# Patient Record
Sex: MALE | Race: OTHER | NOT HISPANIC OR LATINO
[De-identification: names, ages, dates, MRNs, and addresses within clinical notes are randomized per-mention and may not be internally consistent; named-entity substitution may affect disease eponyms.]

---

## 2018-06-26 PROBLEM — Z00.00 ENCOUNTER FOR PREVENTIVE HEALTH EXAMINATION: Status: ACTIVE | Noted: 2018-06-26

## 2018-07-20 PROBLEM — Z78.9 NON-SMOKER: Status: ACTIVE | Noted: 2018-07-20

## 2018-07-20 PROBLEM — Z86.39 HISTORY OF HIGH CHOLESTEROL: Status: RESOLVED | Noted: 2018-07-20 | Resolved: 2018-07-20

## 2018-07-20 PROBLEM — Z86.79 HISTORY OF HIGH BLOOD PRESSURE: Status: RESOLVED | Noted: 2018-07-20 | Resolved: 2018-07-20

## 2018-07-20 PROBLEM — Z87.19 HISTORY OF HEMORRHOIDS: Status: RESOLVED | Noted: 2018-07-20 | Resolved: 2018-07-20

## 2018-07-20 PROBLEM — Z86.39 HISTORY OF VITAMIN D DEFICIENCY: Status: RESOLVED | Noted: 2018-07-20 | Resolved: 2018-07-20

## 2018-07-24 ENCOUNTER — APPOINTMENT (OUTPATIENT)
Dept: SURGERY | Facility: CLINIC | Age: 58
End: 2018-07-24
Payer: COMMERCIAL

## 2018-07-24 VITALS
TEMPERATURE: 98.4 F | BODY MASS INDEX: 25.34 KG/M2 | SYSTOLIC BLOOD PRESSURE: 125 MMHG | WEIGHT: 177 LBS | HEIGHT: 70 IN | DIASTOLIC BLOOD PRESSURE: 76 MMHG | HEART RATE: 76 BPM

## 2018-07-24 DIAGNOSIS — Z86.79 PERSONAL HISTORY OF OTHER DISEASES OF THE CIRCULATORY SYSTEM: ICD-10-CM

## 2018-07-24 DIAGNOSIS — Z87.19 PERSONAL HISTORY OF OTHER DISEASES OF THE DIGESTIVE SYSTEM: ICD-10-CM

## 2018-07-24 DIAGNOSIS — Z86.39 PERSONAL HISTORY OF OTHER ENDOCRINE, NUTRITIONAL AND METABOLIC DISEASE: ICD-10-CM

## 2018-07-24 DIAGNOSIS — Z78.9 OTHER SPECIFIED HEALTH STATUS: ICD-10-CM

## 2018-07-24 PROCEDURE — 99203 OFFICE O/P NEW LOW 30 MIN: CPT

## 2018-07-24 RX ORDER — CHROMIUM 200 MCG
TABLET ORAL
Refills: 0 | Status: ACTIVE | COMMUNITY

## 2018-07-24 RX ORDER — FENOFIBRATE 145 MG/1
TABLET ORAL
Refills: 0 | Status: ACTIVE | COMMUNITY

## 2019-08-06 ENCOUNTER — APPOINTMENT (OUTPATIENT)
Dept: SURGERY | Facility: CLINIC | Age: 59
End: 2019-08-06
Payer: COMMERCIAL

## 2019-08-06 VITALS
BODY MASS INDEX: 24.05 KG/M2 | HEIGHT: 70 IN | HEART RATE: 74 BPM | SYSTOLIC BLOOD PRESSURE: 114 MMHG | DIASTOLIC BLOOD PRESSURE: 67 MMHG | WEIGHT: 168 LBS

## 2019-08-06 DIAGNOSIS — K40.90 UNILATERAL INGUINAL HERNIA, W/OUT OBSTRUCTION OR GANGRENE, NOT SPECIFIED AS RECURRENT: ICD-10-CM

## 2019-08-06 PROCEDURE — 99214 OFFICE O/P EST MOD 30 MIN: CPT

## 2019-08-06 NOTE — CONSULT LETTER
[Dear  ___] : Dear  [unfilled], [Consult Closing:] : Thank you very much for allowing me to participate in the care of this patient.  If you have any questions, please do not hesitate to contact me. [Sincerely,] : Sincerely, [FreeTextEntry3] : Darío Guzman MD\par

## 2019-08-06 NOTE — HISTORY OF PRESENT ILLNESS
[de-identified] : This is a 58 year old patient who was referred by Dr. Theresa Brown with the chief complaint of having swelling in the right groin. He had it for few years and the swelling is getting larger with no real pain. Reducible right inguinal hernia.\par Has pain in the area

## 2019-08-07 PROBLEM — K40.90 INGUINAL HERNIA, RIGHT: Status: ACTIVE | Noted: 2018-07-20

## 2019-08-23 ENCOUNTER — OUTPATIENT (OUTPATIENT)
Dept: OUTPATIENT SERVICES | Facility: HOSPITAL | Age: 59
LOS: 1 days | End: 2019-08-23
Payer: COMMERCIAL

## 2019-08-23 VITALS
SYSTOLIC BLOOD PRESSURE: 136 MMHG | DIASTOLIC BLOOD PRESSURE: 80 MMHG | HEIGHT: 70 IN | HEART RATE: 893 BPM | RESPIRATION RATE: 16 BRPM | OXYGEN SATURATION: 99 % | TEMPERATURE: 98 F

## 2019-08-23 DIAGNOSIS — Z01.818 ENCOUNTER FOR OTHER PREPROCEDURAL EXAMINATION: ICD-10-CM

## 2019-08-23 DIAGNOSIS — K40.90 UNILATERAL INGUINAL HERNIA, WITHOUT OBSTRUCTION OR GANGRENE, NOT SPECIFIED AS RECURRENT: ICD-10-CM

## 2019-08-23 DIAGNOSIS — Z78.9 OTHER SPECIFIED HEALTH STATUS: ICD-10-CM

## 2019-08-23 DIAGNOSIS — Z90.49 ACQUIRED ABSENCE OF OTHER SPECIFIED PARTS OF DIGESTIVE TRACT: Chronic | ICD-10-CM

## 2019-08-23 PROCEDURE — G0463: CPT

## 2019-08-23 RX ORDER — SODIUM CHLORIDE 9 MG/ML
3 INJECTION INTRAMUSCULAR; INTRAVENOUS; SUBCUTANEOUS EVERY 8 HOURS
Refills: 0 | Status: DISCONTINUED | OUTPATIENT
Start: 2019-08-28 | End: 2019-09-14

## 2019-08-23 NOTE — H&P PST ADULT - HISTORY OF PRESENT ILLNESS
60 y/o male with PMH of Hypertriglyceridemia and vitamin D deficiency on medication, presents to [facility] for presurgical evaluation. States, "I have had a small hernia on the right side for over 1 year." Patient was diagnosed with unilateral inguinal hernia without obstruction or gangrene and is scheduled for right inguinal hernia repair 8/28/2019

## 2019-08-23 NOTE — H&P PST ADULT - NSICDXPROBLEM_GEN_ALL_CORE_FT
PROBLEM DIAGNOSES  Problem: Unilateral inguinal hernia, without obstruction or gangrene, not specified as recurrent  Assessment and Plan: Scheduled for right inguinal hernia repair 8/28/2019. Preoperative instructions discussed and given to patient. Verbalized understanding of instructions PROBLEM DIAGNOSES  Problem: Consumes three beers daily  Assessment and Plan: Consumers 5-6 beers daily. Urged to cut down and not to drink week prior to surgery, more specifically not to drink the day of surgery. Surgeon and anesthesia were made aware    Problem: Unilateral inguinal hernia, without obstruction or gangrene, not specified as recurrent  Assessment and Plan: Scheduled for right inguinal hernia repair 8/28/2019. Preoperative instructions discussed and given to patient. Verbalized understanding of instructions

## 2019-08-23 NOTE — H&P PST ADULT - ASSESSMENT
60 y/o male with PMH of Hypertriglyceridemia and vitamin D deficiency on medication, diagnosed with unilateral inguinal hernia without obstruction or gangrene scheduled for right inguinal hernia repair 8/28/2019 58 y/o male with PMH of Hypertriglyceridemia and vitamin D deficiency on medication, drinks 5-6 beers daily, now diagnosed with unilateral inguinal hernia without obstruction or gangrene scheduled for right inguinal hernia repair 8/28/2019

## 2019-08-23 NOTE — H&P PST ADULT - NSALCOHOLUSECOMMENT_GEN_ALL_CORE_FT
Betito problems with alcoholism Denies problems with alcoholism. Urged to cut down and agrees not to drink week prior to surgery

## 2019-08-23 NOTE — H&P PST ADULT - NEGATIVE CARDIOVASCULAR SYMPTOMS
no claudication/no paroxysmal nocturnal dyspnea/no chest pain/no peripheral edema/no dyspnea on exertion/no palpitations/no orthopnea

## 2019-08-23 NOTE — H&P PST ADULT - NEGATIVE GENERAL SYMPTOMS
no fever/no chills/no sweating/no weight gain/no polyphagia/no polyuria/no polydipsia/no fatigue/no anorexia/no malaise/no weight loss

## 2019-08-23 NOTE — H&P PST ADULT - NSANTHOSAYNRD_GEN_A_CORE
No. JYOTI screening performed.  STOP BANG Legend: 0-2 = LOW Risk; 3-4 = INTERMEDIATE Risk; 5-8 = HIGH Risk

## 2019-08-23 NOTE — H&P PST ADULT - RS GEN PE MLT RESP DETAILS PC
no rales/respirations non-labored/no rhonchi/no chest wall tenderness/no intercostal retractions/breath sounds equal/no subcutaneous emphysema/good air movement/normal/airway patent/clear to auscultation bilaterally/no wheezes

## 2019-08-27 ENCOUNTER — TRANSCRIPTION ENCOUNTER (OUTPATIENT)
Age: 59
End: 2019-08-27

## 2019-08-28 ENCOUNTER — APPOINTMENT (OUTPATIENT)
Dept: SURGERY | Facility: HOSPITAL | Age: 59
End: 2019-08-28

## 2019-08-28 ENCOUNTER — OUTPATIENT (OUTPATIENT)
Dept: OUTPATIENT SERVICES | Facility: HOSPITAL | Age: 59
LOS: 1 days | End: 2019-08-28
Payer: COMMERCIAL

## 2019-08-28 VITALS
SYSTOLIC BLOOD PRESSURE: 128 MMHG | DIASTOLIC BLOOD PRESSURE: 74 MMHG | RESPIRATION RATE: 16 BRPM | TEMPERATURE: 98 F | HEART RATE: 79 BPM | OXYGEN SATURATION: 100 %

## 2019-08-28 VITALS
RESPIRATION RATE: 16 BRPM | TEMPERATURE: 98 F | DIASTOLIC BLOOD PRESSURE: 75 MMHG | HEIGHT: 70 IN | WEIGHT: 164.02 LBS | HEART RATE: 79 BPM | SYSTOLIC BLOOD PRESSURE: 120 MMHG | OXYGEN SATURATION: 100 %

## 2019-08-28 DIAGNOSIS — K40.90 UNILATERAL INGUINAL HERNIA, WITHOUT OBSTRUCTION OR GANGRENE, NOT SPECIFIED AS RECURRENT: ICD-10-CM

## 2019-08-28 DIAGNOSIS — Z90.49 ACQUIRED ABSENCE OF OTHER SPECIFIED PARTS OF DIGESTIVE TRACT: Chronic | ICD-10-CM

## 2019-08-28 DIAGNOSIS — Z01.818 ENCOUNTER FOR OTHER PREPROCEDURAL EXAMINATION: ICD-10-CM

## 2019-08-28 PROCEDURE — 49520 REREPAIR ING HERNIA REDUCE: CPT | Mod: RT

## 2019-08-28 PROCEDURE — 49520 REREPAIR ING HERNIA REDUCE: CPT

## 2019-08-28 PROCEDURE — 49520 REREPAIR ING HERNIA REDUCE: CPT | Mod: AS

## 2019-08-28 PROCEDURE — C1781: CPT

## 2019-08-28 RX ORDER — ACETAMINOPHEN WITH CODEINE 300MG-30MG
1 TABLET ORAL EVERY 4 HOURS
Refills: 0 | Status: DISCONTINUED | OUTPATIENT
Start: 2019-08-28 | End: 2019-08-28

## 2019-08-28 RX ORDER — ERGOCALCIFEROL 1.25 MG/1
1 CAPSULE ORAL
Qty: 0 | Refills: 0 | DISCHARGE

## 2019-08-28 RX ORDER — ACETAMINOPHEN WITH CODEINE 300MG-30MG
1 TABLET ORAL
Qty: 6 | Refills: 0
Start: 2019-08-28

## 2019-08-28 RX ORDER — ASPIRIN/CALCIUM CARB/MAGNESIUM 324 MG
1 TABLET ORAL
Qty: 0 | Refills: 0 | DISCHARGE

## 2019-08-28 RX ORDER — FENOFIBRATE,MICRONIZED 130 MG
1 CAPSULE ORAL
Qty: 0 | Refills: 0 | DISCHARGE

## 2019-08-28 RX ADMIN — Medication 1 TABLET(S): at 11:50

## 2019-08-28 RX ADMIN — Medication 1 TABLET(S): at 12:20

## 2019-08-28 NOTE — ASU DISCHARGE PLAN (ADULT/PEDIATRIC) - CARE PROVIDER_API CALL
Darío Guzman (MD)  Surgery  [address], [place] [id number]  Phone: [phone]  Fax: [phone]  Follow Up Time:

## 2019-08-28 NOTE — ASU DISCHARGE PLAN (ADULT/PEDIATRIC) - CALL YOUR DOCTOR IF YOU HAVE ANY OF THE FOLLOWING:
Fever greater than (need to indicate Fahrenheit or Celsius) Fever greater than (need to indicate Fahrenheit or Celsius)/Pain not relieved by Medications/Bleeding that does not stop/Wound/Surgical Site with redness, or foul smelling discharge or pus

## 2019-08-28 NOTE — ASU DISCHARGE PLAN (ADULT/PEDIATRIC) - BATHING
Shower only Shower only/Keep dressing dry, clean, intact, for 2 days. After 2 days, remove dressing and leave steri strips on. You can shower with steri strips on.  If steri-strip falls off after shower its OK, if not you leave it there, it will fall off by itself in 2-3 days.

## 2019-09-03 PROBLEM — E55.9 VITAMIN D DEFICIENCY, UNSPECIFIED: Chronic | Status: ACTIVE | Noted: 2019-08-23

## 2019-09-03 PROBLEM — E78.1 PURE HYPERGLYCERIDEMIA: Chronic | Status: ACTIVE | Noted: 2019-08-23

## 2019-09-09 ENCOUNTER — APPOINTMENT (OUTPATIENT)
Dept: SURGERY | Facility: CLINIC | Age: 59
End: 2019-09-09
Payer: COMMERCIAL

## 2019-09-09 PROCEDURE — 99024 POSTOP FOLLOW-UP VISIT: CPT

## 2019-09-09 NOTE — HISTORY OF PRESENT ILLNESS
[de-identified] : Patient is a 59 y.o M s/p repair of recurrent R. inguinal hernia w mesh, 08/28/19. Patient is doing well, without reported complaints. No pain. Denies any urinary symptoms.

## 2019-09-09 NOTE — ASSESSMENT
[FreeTextEntry1] : Patient is doing well, with excellent post-operative recovery. Surgical incision healing well and as expected. There is no evidence of infection or complication, and patient is progressing as expected.\par

## 2019-09-09 NOTE — PHYSICAL EXAM
[Normal Breath Sounds] : Normal breath sounds [Normal Rate and Rhythm] : normal rate and rhythm [No Rash or Lesion] : No rash or lesion [Alert] : alert [Oriented to Person] : oriented to person [Oriented to Place] : oriented to place [Calm] : calm [Oriented to Time] : oriented to time [JVD] : no jugular venous distention  [de-identified] : A/Ox3; NAD. appears comfortable [de-identified] : sclera anicteric  [de-identified] : abd soft, NT/ND [de-identified] : Wound healing well with no hematoma, erythema or drainage; No evidence of infection.\par \par

## 2019-09-09 NOTE — PLAN
[FreeTextEntry1] : Post operative wound care, activity and restrictions/precautions were reinforced. \par Patient was instructed to refrain from any heavy lifting >10-15 lbs for at least 4 weeks post operatively. \par \par Patient's questions and concerns addressed.\par \par Warning signs, follow up, and restrictions were discussed with the patient.\par patient will follow up if needed. \par

## 2019-09-09 NOTE — REASON FOR VISIT
[Post Op: _________] : a [unfilled] post op visit [FreeTextEntry1] : s/p repair of R. inguinal hernia, 08/28/19

## 2020-10-16 NOTE — BRIEF OPERATIVE NOTE - URINE OUTPUT
Bed: 04TR-04  Expected date:   Expected time:   Means of arrival:   Comments:  EMS - [address], RN  10/15/20 2120 0

## 2024-12-16 NOTE — ASU PREOP CHECKLIST - WAS PATIENT ON BETA BLOCKER?
Patient Education     Well Child Exam 15 to 18 Years   About this topic   Your teen's well child exam is a visit with the doctor to check your child's health. The doctor measures your teen's weight and height, and may measure your teen's body mass index (BMI). The doctor plots these numbers on a growth curve. The growth curve gives a picture of your teen's growth at each visit. The doctor may listen to your teen's heart, lungs, and belly. Your doctor will do a full exam of your teen from the head to the toes.  Your teen may also need shots or blood tests during this visit.  General   Growth and Development   Your doctor will ask you how your teen is developing. The doctor will focus on the skills that most teens your child's age are expected to do. During this time of your teen's life, here are some things you can expect.  Physical development - Your teen may:  Look physically older than actual age  Need reminders about drinking water when active  Not want to do physical activity if your teen does not feel good at sports  Hearing, seeing, and talking - Your teen may:  Be able to see the long-term effects of actions  Have more ability to think and reason logically  Understand many viewpoints  Spend more time using interactive media, rather than face-to-face communication  Feelings and behavior - Your teen may:  Be very independent  Spend a great deal of time with friends  Have an interest in dating  Value the opinions of friends over parents' thoughts or ideas  Want to push the limits of what is allowed  Believe bad things won’t happen to them  Feel very sad or have a low mood at times  Feeding - Your teen needs:  To learn to make healthy choices when eating. Serve healthy foods like lean meats, fruits, vegetables, and whole grains. Help your teen choose healthy foods when out to eat.  To start each day with a healthy breakfast  To limit soda, chips, candy, and foods that are high in fats  Healthy snacks available  like fruit, cheese and crackers, or peanut butter  To eat meals as a part of the family. Turn the TV and cell phones off while eating. Talk about your day, rather than focusing on what your teen is eating.  Sleep - Your teen:  Needs 8 to 9 hours of sleep each night  Should be allowed to read each night before bed. Have your teen brush and floss the teeth before going to bed as well.  Should limit TV, phone, and computers for an hour before bedtime  Keep cell phones, tablets, televisions, and other electronic devices out of bedrooms overnight. They interfere with sleep.  Needs a routine to make week nights easier. Encourage your teen to get up at a normal time on weekends instead of sleeping late.  Shots or vaccines - It is important for your teen to get shots on time. This protects your teen from very serious illnesses like pneumonia, blood and brain infections, tetanus, flu, or cancer. Your teen may need:  HPV or human papillomavirus vaccine  Influenza vaccine  Meningococcal vaccine  COVID-19 vaccine  Help for Parents   Activities.  Encourage your teen to spend at least 30 to 60 minutes each day being physically active.  Offer your teen a variety of activities to take part in. Include music, sports, arts and crafts, and other things your teen is interested in. Take care not to over schedule your teen. One to 2 activities a week outside of school is often a good number for your teen.  Make sure your teen wears a helmet when using anything with wheels like skates, skateboard, bike, etc.  Encourage time spent with friends. Provide a safe area for this.  Know where and who your teen is with at all times. Get to know your teen's friends and families.  Here are some things you can do to help keep your teen safe and healthy.  Teach your teen about safe driving. Remind your teen never to ride with someone who has been drinking or using drugs. Talk about distracted driving. Teach your teen never to text or use a cell phone  while driving.  Make sure your teen uses a seat belt when driving or riding in a car. Talk with your teen about how many passengers are allowed in the car.  Talk to your teen about the dangers of smoking, drinking alcohol, and using drugs. Do not allow anyone to smoke in your home or around your teen.  Talk with your teen about peer pressure. Help your teen learn how to handle risky things friends may want to do.  Talk about sexually responsible behavior and delaying sexual intercourse. Discuss birth control and sexually transmitted diseases. Talk about how alcohol or drugs can influence the ability to make good decisions.  Remind your teen to use headphones responsibly. Limit how loud the volume is turned up. Never wear headphones, text, or use a cell phone while riding a bike or crossing the street.  Protect your teen from gun injuries. If you have a gun, use a trigger lock. Keep the gun locked up and the bullets kept in a separate place.  Limit screen time for teens to 1 to 2 hours per day. This includes TV, phones, computers, and video games.  Parents need to think about:  Monitoring your teen's computer and phone use, especially when on the Internet  How to keep open lines of communication about sex and dating  College and work plans for your teen  Finding an adult doctor to care for your teen  Turning responsibilities of health care over to your teen  Having your teen help with some family chores to encourage responsibility within the family  The next well teen visit will most likely be in 1 year. At this visit, your doctor may:  Do a full check up on your teen  Talk about college and work  Talk about sexuality and sexually-transmitted diseases  Talk about driving and safety  When do I need to call the doctor?   Fever of 100.4°F (38°C) or higher  Low mood, suddenly getting poor grades, or missing school  You are worried about alcohol or drug use  You are worried about your teen's development  Last Reviewed  Date   2021-11-04  Consumer Information Use and Disclaimer   This generalized information is a limited summary of diagnosis, treatment, and/or medication information. It is not meant to be comprehensive and should be used as a tool to help the user understand and/or assess potential diagnostic and treatment options. It does NOT include all information about conditions, treatments, medications, side effects, or risks that may apply to a specific patient. It is not intended to be medical advice or a substitute for the medical advice, diagnosis, or treatment of a health care provider based on the health care provider's examination and assessment of a patient’s specific and unique circumstances. Patients must speak with a health care provider for complete information about their health, medical questions, and treatment options, including any risks or benefits regarding use of medications. This information does not endorse any treatments or medications as safe, effective, or approved for treating a specific patient. UpToDate, Inc[employer] and its affiliates disclaim any warranty or liability relating to this information or the use thereof. The use of this information is governed by the Terms of Use, available at https://www.woltersParadineuwer.com/en/know/clinical-effectiveness-terms   Copyright   Copyright © 2024 UpToDate, Inc[employer] and its affiliates and/or licensors. All rights reserved.     No

## 2025-07-24 NOTE — ASU DISCHARGE PLAN (ADULT/PEDIATRIC) - PROVIDER TOKENS
Orthopedics Sports Medicine Knee Follow Up Visit    Name: Rivas Gtz      : 2004      MRN: [id number]  Encounter Provider: Olivier Lozoya DO  Encounter Date: 2025   Encounter department: [facility]  :  Assessment & Plan  Chondromalacia of right patellofemoral joint                Assesment:     21 y.o. male right knee partial thickness chondral defect of the medial facet of the patella     Plan:    Conservative treatment:    Ice to knee for 20 minutes at least 1-2 times daily.  PT for ROM/strengthening to knee, hip and core.    Imaging:    All imaging from today was reviewed by myself and explained to the patient.       Injection:    We did discuss cortisone versus viscosupplementation injections but will hold off at this time. Patient has tried PRP injections without any improvement.     Surgery:     No surgery is recommended at this point, continue with conservative treatment plan as noted.    Follow up:    No follow-ups on file.      History of Present Illness   HPI  Chief Complaint   Patient presents with    Right Knee - Follow-up     Pt returns to f/u on R knee pain. Notes constant medial pain. [facility] Cannot remember name of medication       History of Present Illness:    The patient is returns for follow up of right knee.  Since the prior visit, He reports minimal improvement.     Pain is located anterior, medial.     Pain is improved by rest.  Pain is aggravated by weight bearing.       The patient has tried rest, ice, NSAIDS, and physical therapy.        His pain is similar.  No other issues at this time.        Knee Surgical History:  None    Past Medical, Social and Family History:  Past Medical History[1]  Past Surgical History[2]  Allergies[3]  Medications Ordered Prior to Encounter[4]  Social History     Socioeconomic History    Marital status: Single     Spouse name: Not on file    Number of children: Not  "on file    Years of education: Not on file    Highest education level: Not on file   Occupational History    Not on file   Tobacco Use    Smoking status: Never    Smokeless tobacco: Never   Vaping Use    Vaping status: Never Used   Substance and Sexual Activity    Alcohol use: Yes     Comment: soc    Drug use: Never    Sexual activity: Not on file   Other Topics Concern    Not on file   Social History Narrative    Not on file     Social Drivers of Health     Financial Resource Strain: Not on file   Food Insecurity: Not on file   Transportation Needs: Not on file   Physical Activity: Not on file   Stress: Not on file   Social Connections: Not on file   Intimate Partner Violence: Not on file   Housing Stability: Not on file         I have reviewed the past medical, surgical, social and family history, medications and allergies as documented in the EMR.    Review of systems: ROS is negative other than that noted in the HPI.  Constitutional: Negative for fatigue and fever.     Objective   Ht 6' 1\" (1.854 m)   Wt 88 kg (194 lb)   BMI 25.60 kg/m²      Physical Exam:    Height 6' 1\" (1.854 m), weight 88 kg (194 lb).    General/Constitutional: NAD, well developed, well nourished  HENT: Normocephalic, atraumatic  CV: Intact distal pulses, regular rate  Resp: No respiratory distress or labored breathing  GI: Soft and non-tender   Lymphatic: No lymphadenopathy palpated  Neuro: Alert and Oriented x 3, no focal deficits  Psych: Normal mood, normal affect, normal judgement, normal behavior  Skin: Warm, dry, no rashes, no erythema      Knee Exam (focused):               RIGHT LEFT   ROM:   0-130 0-130   Palpation: Effusion negative negative     MJL tenderness Negative Negative     LJL tenderness Negative Negative   Meniscus: Isha Negative Negative    Apley's Compression Negative Negative   Instability: Varus stable stable     Valgus stable stable   Special Tests: Lachman Negative Negative     Posterior drawer Negative " Negative     Anterior drawer Negative Negative     Pivot shift not tested not tested     Dial not tested not tested   Patella: Palpation medial facet ttp no tenderness     Mobility 1/4 1/4     Apprehension Negative Negative   Other: Single leg 1/4 squat not tested not tested           LE NV Exam: +2 DP/PT pulses bilaterally  Sensation intact to light touch L2-S1 bilaterally    No calf tenderness to palpation bilaterally         [1] No past medical history on file.  [2]   Past Surgical History:  Procedure Laterality Date    KNEE SURGERY Right 04/19/2023   [3] No Known Allergies  [4]   Current Outpatient Medications on File Prior to Visit   Medication Sig Dispense Refill    Diclofenac Sodium (VOLTAREN) 1 % Apply 2 g topically 4 (four) times a day (Patient not taking: Reported on 4/11/2025)      naproxen (EC NAPROSYN) 500 MG EC tablet Take 500 mg by mouth 2 (two) times a day with meals (Patient not taking: Reported on 4/11/2025)       No current facility-administered medications on file prior to visit.      PROVIDER:[TOKEN:[[id number]]]